# Patient Record
Sex: FEMALE | Race: WHITE | NOT HISPANIC OR LATINO
[De-identification: names, ages, dates, MRNs, and addresses within clinical notes are randomized per-mention and may not be internally consistent; named-entity substitution may affect disease eponyms.]

---

## 2021-06-15 PROBLEM — Z00.00 ENCOUNTER FOR PREVENTIVE HEALTH EXAMINATION: Status: ACTIVE | Noted: 2021-06-15

## 2021-06-22 ENCOUNTER — APPOINTMENT (OUTPATIENT)
Age: 41
End: 2021-06-22
Payer: COMMERCIAL

## 2021-06-22 VITALS
WEIGHT: 192 LBS | SYSTOLIC BLOOD PRESSURE: 140 MMHG | HEIGHT: 69 IN | RESPIRATION RATE: 14 BRPM | HEART RATE: 89 BPM | OXYGEN SATURATION: 99 % | DIASTOLIC BLOOD PRESSURE: 82 MMHG | BODY MASS INDEX: 28.44 KG/M2

## 2021-06-22 DIAGNOSIS — Z82.5 FAMILY HISTORY OF ASTHMA AND OTHER CHRONIC LOWER RESPIRATORY DISEASES: ICD-10-CM

## 2021-06-22 DIAGNOSIS — Z86.79 PERSONAL HISTORY OF OTHER DISEASES OF THE CIRCULATORY SYSTEM: ICD-10-CM

## 2021-06-22 PROCEDURE — 99203 OFFICE O/P NEW LOW 30 MIN: CPT | Mod: 25

## 2021-06-22 PROCEDURE — 99072 ADDL SUPL MATRL&STAF TM PHE: CPT

## 2021-06-22 PROCEDURE — 94010 BREATHING CAPACITY TEST: CPT

## 2021-06-22 NOTE — PHYSICAL EXAM
[No Acute Distress] : no acute distress [Normal Oropharynx] : normal oropharynx [Normal Appearance] : normal appearance [No Neck Mass] : no neck mass [Normal Rate/Rhythm] : normal rate/rhythm [Normal S1, S2] : normal s1, s2 [No Murmurs] : no murmurs [Clear to Auscultation Bilaterally] : clear to auscultation bilaterally [No Resp Distress] : no resp distress [No Abnormalities] : no abnormalities [Benign] : benign [Normal Gait] : normal gait [No Clubbing] : no clubbing [No Cyanosis] : no cyanosis [No Edema] : no edema [FROM] : FROM [Normal Color/ Pigmentation] : normal color/ pigmentation [Oriented x3] : oriented x3 [No Focal Deficits] : no focal deficits [Normal Affect] : normal affect

## 2021-06-22 NOTE — HISTORY OF PRESENT ILLNESS
[Dyspnea] : dyspnea [Shortness of Breath] : Shortness of Breath [Lightheadedness] : lightheadedness [Cough] : no cough [Wheezing] : no wheezing [Fever] : no fever [Weight Gain] : weight gain

## 2021-06-22 NOTE — DISCUSSION/SUMMARY
[FreeTextEntry1] : SOB ON EXERTION, NON SMOKER/ WEIGHT GAIN\par ASTHMA\par PFT\par CXR\par CARDIO EVAL

## 2021-10-26 ENCOUNTER — APPOINTMENT (OUTPATIENT)
Age: 41
End: 2021-10-26
Payer: COMMERCIAL

## 2021-10-26 VITALS
HEIGHT: 69 IN | WEIGHT: 190 LBS | DIASTOLIC BLOOD PRESSURE: 78 MMHG | HEART RATE: 89 BPM | RESPIRATION RATE: 14 BRPM | OXYGEN SATURATION: 99 % | SYSTOLIC BLOOD PRESSURE: 128 MMHG | BODY MASS INDEX: 28.14 KG/M2

## 2021-10-26 PROCEDURE — 94729 DIFFUSING CAPACITY: CPT

## 2021-10-26 PROCEDURE — 94010 BREATHING CAPACITY TEST: CPT

## 2021-10-26 PROCEDURE — 94727 GAS DIL/WSHOT DETER LNG VOL: CPT

## 2021-10-26 PROCEDURE — 99213 OFFICE O/P EST LOW 20 MIN: CPT | Mod: 25

## 2021-10-26 NOTE — DISCUSSION/SUMMARY
[FreeTextEntry1] : SOB ON EXERTION, NON SMOKER/ WEIGHT GAIN\par ASTHMA\par PFT DISCUSSED\par POX ON EXERTION IF DESATURATION CHEST CT HR

## 2021-10-26 NOTE — HISTORY OF PRESENT ILLNESS
[TextBox_4] : SOB ON EXERTION SP PFT MODERATED REDUCTION IN DLCO\par SAW CARDIO\par BLOOD TEST REVIEWED

## 2023-11-14 ENCOUNTER — APPOINTMENT (OUTPATIENT)
Dept: PULMONOLOGY | Facility: CLINIC | Age: 43
End: 2023-11-14
Payer: COMMERCIAL

## 2023-11-14 VITALS
OXYGEN SATURATION: 99 % | HEIGHT: 69 IN | HEART RATE: 84 BPM | RESPIRATION RATE: 12 BRPM | WEIGHT: 184 LBS | SYSTOLIC BLOOD PRESSURE: 140 MMHG | BODY MASS INDEX: 27.25 KG/M2 | DIASTOLIC BLOOD PRESSURE: 80 MMHG

## 2023-11-14 DIAGNOSIS — R06.02 SHORTNESS OF BREATH: ICD-10-CM

## 2023-11-14 DIAGNOSIS — J45.909 UNSPECIFIED ASTHMA, UNCOMPLICATED: ICD-10-CM

## 2023-11-14 DIAGNOSIS — R09.82 POSTNASAL DRIP: ICD-10-CM

## 2023-11-14 PROCEDURE — 99213 OFFICE O/P EST LOW 20 MIN: CPT

## 2023-11-14 RX ORDER — FLUTICASONE PROPIONATE 50 UG/1
50 SPRAY, METERED NASAL TWICE DAILY
Qty: 1 | Refills: 5 | Status: ACTIVE | COMMUNITY
Start: 2023-11-14 | End: 1900-01-01

## 2023-11-14 RX ORDER — ALBUTEROL SULFATE 90 UG/1
108 (90 BASE) INHALANT RESPIRATORY (INHALATION) EVERY 4 HOURS
Qty: 27 | Refills: 5 | Status: ACTIVE | COMMUNITY
Start: 2023-11-14 | End: 1900-01-01